# Patient Record
(demographics unavailable — no encounter records)

---

## 2025-01-23 NOTE — PHYSICAL EXAM
[Surgically Absent] : surgically absent [Clear to Auscultation] : lungs were clear to auscultation bilaterally [Normal Gait and Station] : normal gait and station [Normal muscle strength, symmetry and tone of facial, head and neck musculature] : normal muscle strength, symmetry and tone of facial, head and neck musculature [Complete] : complete cerumen impaction [Normal] : the left membrane was normal [Placement/Patency] : tympanostomy tube not in place and patent [Effusion] : no effusion [Exposed Vessel] : left anterior vessel not exposed [Wheezing] : no wheezing [Increased Work of Breathing] : no increased work of breathing with use of accessory muscles and retractions [de-identified] : TM not healed [de-identified] : tube is out in the canal

## 2025-01-23 NOTE — ADDENDUM
[FreeTextEntry1] :   Documented by Jt Gorman acting as scribe for Dr. Simpson on 01/23/2025. All Medical record entries made by the Scribe were at my, Dr. Simpson, direction and personally dictated by me on 01/23/2025 . I have reviewed the chart and agree that the record accurately reflects my personal performance of the history, physical exam, assessment and plan. I have also personally directed, reviewed, and agreed with the discharge instructions.

## 2025-01-23 NOTE — CONSULT LETTER
[Dear  ___] : Dear  [unfilled], [Please see my note below.] : Please see my note below. [Consult Closing:] : Thank you very much for allowing me to participate in the care of this patient.  If you have any questions, please do not hesitate to contact me. [Sincerely,] : Sincerely, [Courtesy Letter:] : I had the pleasure of seeing your patient, [unfilled], in my office today. [FreeTextEntry3] :  Rosalio Simpson MD, PhD  Chief, Division of Laryngology  Department of Otolaryngology  [facility]  Pediatric Otolaryngology, [facility]   of Otolaryngology  [facility]  [FreeTextEntry2] : Dear Dr. Jensen Mcdermott

## 2025-07-24 NOTE — HISTORY OF PRESENT ILLNESS
[de-identified] : 4yM with history of OME and snoring s/p T&A, BMT 8/14/23. At last visit with both tubes out. Right TM was not healed at time.  Occasional yellow otorrhea out of right ear 5-6 weeks ago. Mom with concerns not fully healed.  Denies foul smelling. Using drops, no drainage since.   No complaints of otalgia. Denies hearing changes. Denies nasal congestion or snoring concerns.

## 2025-07-24 NOTE — PHYSICAL EXAM
[Surgically Absent] : surgically absent [Clear to Auscultation] : lungs were clear to auscultation bilaterally [Normal Gait and Station] : normal gait and station [Normal muscle strength, symmetry and tone of facial, head and neck musculature] : normal muscle strength, symmetry and tone of facial, head and neck musculature [Normal] : the left canal was normal [Placement/Patency] : tympanostomy tube not in place and patent [Effusion] : no effusion [Exposed Vessel] : left anterior vessel not exposed [Wheezing] : no wheezing [Increased Work of Breathing] : no increased work of breathing with use of accessory muscles and retractions [FreeTextEntry8] : Needs patch [de-identified] : TM not healed [de-identified] : tube is out in the canal

## 2025-07-24 NOTE — HISTORY OF PRESENT ILLNESS
[de-identified] : 4yM with history of OME and snoring s/p T&A, BMT 8/14/23. At last visit with both tubes out. Right TM was not healed at time.  Occasional yellow otorrhea out of right ear 5-6 weeks ago. Mom with concerns not fully healed.  Denies foul smelling. Using drops, no drainage since.   No complaints of otalgia. Denies hearing changes. Denies nasal congestion or snoring concerns.

## 2025-07-24 NOTE — PHYSICAL EXAM
[Surgically Absent] : surgically absent [Clear to Auscultation] : lungs were clear to auscultation bilaterally [Normal Gait and Station] : normal gait and station [Normal muscle strength, symmetry and tone of facial, head and neck musculature] : normal muscle strength, symmetry and tone of facial, head and neck musculature [Normal] : the left canal was normal [Placement/Patency] : tympanostomy tube not in place and patent [Effusion] : no effusion [Exposed Vessel] : left anterior vessel not exposed [Wheezing] : no wheezing [Increased Work of Breathing] : no increased work of breathing with use of accessory muscles and retractions [FreeTextEntry8] : Needs patch [de-identified] : TM not healed [de-identified] : tube is out in the canal

## 2025-07-24 NOTE — CONSULT LETTER
[Dear  ___] : Dear  [unfilled], [Courtesy Letter:] : I had the pleasure of seeing your patient, [unfilled], in my office today. [Please see my note below.] : Please see my note below. [Consult Closing:] : Thank you very much for allowing me to participate in the care of this patient.  If you have any questions, please do not hesitate to contact me. [Sincerely,] : Sincerely, [FreeTextEntry2] : Dear Dr. Jensen Mcdermott [FreeTextEntry3] :  Rosalio Simpson MD, PhD  Chief, Division of Laryngology  Department of Otolaryngology  [facility]  Pediatric Otolaryngology, [facility]   of Otolaryngology  [facility]

## 2025-07-24 NOTE — CONSULT LETTER
[Dear  ___] : Dear  [unfilled], [Courtesy Letter:] : I had the pleasure of seeing your patient, [unfilled], in my office today. [Please see my note below.] : Please see my note below. [Consult Closing:] : Thank you very much for allowing me to participate in the care of this patient.  If you have any questions, please do not hesitate to contact me. [Sincerely,] : Sincerely, [FreeTextEntry2] : Dear Dr. Jensen Mcdermott [FreeTextEntry3] :  Rosalio Simpson MD, PhD  Chief, Division of Laryngology  Department of Otolaryngology  [facility]  Pediatric Otolaryngology, [facility]   of Otolaryngology  [facility]

## 2025-07-24 NOTE — ADDENDUM
[FreeTextEntry1] : Documented by Minoo Cruz acting as scribe for Dr. Simpson on 07/24/2025. All Medical record entries made by the Scribe were at my, Dr. Simpson, direction and personally dictated by me on 07/24/2025 . I have reviewed the chart and agree that the record accurately reflects my personal performance of the history, physical exam, assessment and plan. I have also personally directed, reviewed, and agreed with the discharge instructions.